# Patient Record
Sex: FEMALE | Race: WHITE | NOT HISPANIC OR LATINO | Employment: OTHER | URBAN - METROPOLITAN AREA
[De-identification: names, ages, dates, MRNs, and addresses within clinical notes are randomized per-mention and may not be internally consistent; named-entity substitution may affect disease eponyms.]

---

## 2023-05-15 DIAGNOSIS — F41.9 ANXIETY: Primary | ICD-10-CM

## 2023-05-15 DIAGNOSIS — E78.5 HYPERLIPIDEMIA, UNSPECIFIED HYPERLIPIDEMIA TYPE: ICD-10-CM

## 2023-05-15 DIAGNOSIS — R79.89 ELEVATED TSH: ICD-10-CM

## 2023-05-15 DIAGNOSIS — Z79.01 ANTICOAGULANT LONG-TERM USE: ICD-10-CM

## 2023-05-15 RX ORDER — ATORVASTATIN CALCIUM 20 MG/1
20 TABLET, FILM COATED ORAL DAILY
COMMUNITY
End: 2023-05-15 | Stop reason: SDUPTHER

## 2023-05-15 RX ORDER — ATOVAQUONE AND PROGUANIL HYDROCHLORIDE 250; 100 MG/1; MG/1
TABLET, FILM COATED ORAL
COMMUNITY
Start: 2023-05-02 | End: 2023-10-13 | Stop reason: ALTCHOICE

## 2023-05-15 RX ORDER — LEVOTHYROXINE SODIUM 25 UG/1
25 TABLET ORAL
COMMUNITY
End: 2023-05-15 | Stop reason: SDUPTHER

## 2023-05-15 RX ORDER — CITALOPRAM 20 MG/1
20 TABLET, FILM COATED ORAL DAILY
Qty: 90 TABLET | Refills: 3 | Status: SHIPPED | OUTPATIENT
Start: 2023-05-15

## 2023-05-15 RX ORDER — RIVAROXABAN 10 MG/1
10 TABLET, FILM COATED ORAL DAILY
COMMUNITY
End: 2023-05-15 | Stop reason: SDUPTHER

## 2023-05-15 RX ORDER — CITALOPRAM 20 MG/1
20 TABLET, FILM COATED ORAL DAILY
COMMUNITY
End: 2023-05-15 | Stop reason: SDUPTHER

## 2023-05-15 RX ORDER — ATORVASTATIN CALCIUM 20 MG/1
20 TABLET, FILM COATED ORAL DAILY
Qty: 90 TABLET | Refills: 3 | Status: SHIPPED | OUTPATIENT
Start: 2023-05-15 | End: 2024-06-03 | Stop reason: SDUPTHER

## 2023-05-15 RX ORDER — LEVOTHYROXINE SODIUM 25 UG/1
25 TABLET ORAL
Qty: 90 TABLET | Refills: 3 | Status: SHIPPED | OUTPATIENT
Start: 2023-05-15

## 2023-09-25 ENCOUNTER — OFFICE VISIT (OUTPATIENT)
Dept: PRIMARY CARE | Facility: CLINIC | Age: 73
End: 2023-09-25
Payer: MEDICARE

## 2023-09-25 VITALS
WEIGHT: 144 LBS | OXYGEN SATURATION: 95 % | SYSTOLIC BLOOD PRESSURE: 129 MMHG | BODY MASS INDEX: 26.5 KG/M2 | HEIGHT: 62 IN | HEART RATE: 62 BPM | DIASTOLIC BLOOD PRESSURE: 82 MMHG

## 2023-09-25 DIAGNOSIS — Z00.00 PREVENTATIVE HEALTH CARE: ICD-10-CM

## 2023-09-25 DIAGNOSIS — Z79.899 DRUG THERAPY: ICD-10-CM

## 2023-09-25 DIAGNOSIS — Z00.00 ROUTINE GENERAL MEDICAL EXAMINATION AT HEALTH CARE FACILITY: Primary | ICD-10-CM

## 2023-09-25 DIAGNOSIS — Z13.9 SCREENING FOR CONDITION: ICD-10-CM

## 2023-09-25 DIAGNOSIS — Z12.31 ENCOUNTER FOR SCREENING MAMMOGRAM FOR MALIGNANT NEOPLASM OF BREAST: ICD-10-CM

## 2023-09-25 DIAGNOSIS — E55.9 VITAMIN D DEFICIENCY: ICD-10-CM

## 2023-09-25 DIAGNOSIS — Z12.11 ENCOUNTER FOR SCREENING FOR MALIGNANT NEOPLASM OF COLON: ICD-10-CM

## 2023-09-25 DIAGNOSIS — E03.9 HYPOTHYROIDISM, UNSPECIFIED TYPE: ICD-10-CM

## 2023-09-25 DIAGNOSIS — E78.5 HYPERLIPIDEMIA, UNSPECIFIED HYPERLIPIDEMIA TYPE: ICD-10-CM

## 2023-09-25 PROCEDURE — 1159F MED LIST DOCD IN RCRD: CPT | Performed by: FAMILY MEDICINE

## 2023-09-25 PROCEDURE — 99417 PROLNG OP E/M EACH 15 MIN: CPT | Performed by: FAMILY MEDICINE

## 2023-09-25 PROCEDURE — G0439 PPPS, SUBSEQ VISIT: HCPCS | Performed by: FAMILY MEDICINE

## 2023-09-25 PROCEDURE — 1160F RVW MEDS BY RX/DR IN RCRD: CPT | Performed by: FAMILY MEDICINE

## 2023-09-25 PROCEDURE — 99397 PER PM REEVAL EST PAT 65+ YR: CPT | Performed by: FAMILY MEDICINE

## 2023-09-25 PROCEDURE — 99215 OFFICE O/P EST HI 40 MIN: CPT | Performed by: FAMILY MEDICINE

## 2023-09-25 PROCEDURE — 1170F FXNL STATUS ASSESSED: CPT | Performed by: FAMILY MEDICINE

## 2023-09-25 PROCEDURE — 1036F TOBACCO NON-USER: CPT | Performed by: FAMILY MEDICINE

## 2023-09-25 ASSESSMENT — ENCOUNTER SYMPTOMS
LOSS OF SENSATION IN FEET: 0
OCCASIONAL FEELINGS OF UNSTEADINESS: 0
DEPRESSION: 0

## 2023-09-25 ASSESSMENT — ACTIVITIES OF DAILY LIVING (ADL)
TAKING_MEDICATION: INDEPENDENT
BATHING: INDEPENDENT
DRESSING: INDEPENDENT
DOING_HOUSEWORK: INDEPENDENT
GROCERY_SHOPPING: INDEPENDENT
MANAGING_FINANCES: INDEPENDENT

## 2023-09-25 NOTE — PROGRESS NOTES
"Subjective   Reason for Visit: Jacquelyn Groves is an 73 y.o. female here for a Medicare Wellness visit.          Reviewed all medications by prescribing practitioner or clinical pharmacist (such as prescriptions, OTCs, herbal therapies and supplements) and documented in the medical record.      Patient Care Team:  Niraj Marsh DO as PCP - General (Family Medicine)  Yaneli Way MD as PCP - [employer]age PCP     Review of Systems  Denies N/V/D/C, no HA/S/V, no CP/SOB. Denies fevers/chills.  Positive positive for poison ivy.  All other systems were negative.    Objective   Vitals:  /82 (BP Location: Left arm, Patient Position: Sitting)   Pulse 62   Ht 1.562 m (5' 1.5\")   Wt 65.3 kg (144 lb)   SpO2 95%   BMI 26.77 kg/m²       Physical Exam  Gen: NAD  eyes: EOMI, PERRLA  ENT: hearing grossly intact, no nasal discharge  resp: CTABL, without R/R  heart: RRR without MRG  GI: abd: S/ND/NT, BS+  lymph: no axillary, cervical, supraclavicular lymphadenopathy noted   MS: gait grossly WNL,  derm: no rashes or lesions noted  neuro: CN II-XII grossly intact  psych: A&Ox3    Assessment/Plan   Problem List Items Addressed This Visit    None  Visit Diagnoses       Routine general medical examination at health care facility    -  Primary    Drug therapy        Relevant Medications    coQ10, ubiquinol, 200 mg capsule    Other Relevant Orders    CBC and Auto Differential    Comprehensive Metabolic Panel    Magnesium    Urinalysis with Reflex Microscopic    Encounter for screening for malignant neoplasm of colon        Relevant Orders    Cologuard® colon cancer screening    Encounter for screening mammogram for malignant neoplasm of breast        Relevant Orders    BI mammo right diagnostic    Screening for condition        Relevant Orders    Lipid Panel    Hemoglobin A1C    Hypothyroidism, unspecified type        Relevant Orders    Thyroxine, Free    Triiodothyronine, Free    Thyroid Stimulating " Hormone    Vitamin D deficiency        Relevant Orders    Vitamin D 25-Hydroxy,Total (for eval of Vitamin D levels)    Preventative health care        Relevant Orders    CBC and Auto Differential    Comprehensive Metabolic Panel    Magnesium    Lipid Panel    Hemoglobin A1C    Urinalysis with Reflex Microscopic    Vitamin D 25-Hydroxy,Total (for eval of Vitamin D levels)    Thyroxine, Free    Triiodothyronine, Free    Thyroid Stimulating Hormone    Hyperlipidemia, unspecified hyperlipidemia type        Relevant Orders    Lipid Panel                 doing Medicare wellness, annual preventative, lab planning.    -----  Due for annual flu shot, RSV immunization, next booster for COVID.  Is due for the shingles series.  Most recent Tdap was 2018.  Up-to-date on pneumonia series.  -->> Check with your pharmacy to keep up-to-date on immunizations.    Screening for colon cancer. -->> We will order Cologuard test.    Screening for breast cancer. -->> will order.  -----    Drug therapy, screening for condition, vitamin D deficiency. Is on 4000 units daily and has been for several years.  Most recent vitamin D on the chart was 31 but that was from 2019. -->>  Will check annual labs before next appointment.    Hyperlipidemia, on atorvastatin 20 mg daily, cholesterol numbers are good on that. -->>  Will refill atorvastatin as needed.  Recommend starting coenzyme Q 10/co-Q10 200 mg daily.  Cheapest is fine, no reason to buy more expensive types.  Should be able to find it for less than $0.20 a pill for the 200 mg.    Anxiety.  In general does well with 20 mg of citalopram/Celexa daily. -->>  Will refill as needed.      DVT and PE, seeing Dr. Miranda, and Griselda Jones, cardiology, is on Xarelto.  Recent cardiac CT calcium scoring result was 0. -->>  Follow-up as planned.      - Patient will return in about 6 weeks for annual lab review.  - Patient is going to go to the [facility] about a week before the appointment to get  fasting annual labs done.

## 2023-09-25 NOTE — PATIENT INSTRUCTIONS
doing Medicare wellness, annual preventative, lab planning.    -----  Due for annual flu shot, RSV immunization, next booster for COVID.  Is due for the shingles series.  Most recent Tdap was 2018.  Up-to-date on pneumonia series.  -->> Check with your pharmacy to keep up-to-date on immunizations.    Screening for colon cancer. -->> We will order Cologuard test.    Screening for breast cancer. -->> will order.  -----  Denies N/V/D/C, no HA/S/V, no CP/SOB. Denies fevers/chills.  Positive positive for poison ivy.  All other systems were negative.      Drug therapy, screening for condition, vitamin D deficiency. Is on 4000 units daily and has been for several years.  Most recent vitamin D on the chart was 31 but that was from 2019. -->>  Will check annual labs before next appointment.    Hyperlipidemia, on atorvastatin 20 mg daily, cholesterol numbers are good on that. -->>  Will refill atorvastatin as needed.  Recommend starting coenzyme Q 10/co-Q10 200 mg daily.  Cheapest is fine, no reason to buy more expensive types.  Should be able to find it for less than $0.20 a pill for the 200 mg.    Anxiety.  In general does well with 20 mg of citalopram/Celexa daily. -->>  Will refill as needed.      DVT and PE, seeing Dr. Miranda, and Griselda Jones, cardiology, is on Xarelto.  Recent cardiac CT calcium scoring result was 0. -->>  Follow-up as planned.      - Patient will return in about 6 weeks for annual lab review.  - Patient is going to go to the [facility] about a week before the appointment to get fasting annual labs done.       no

## 2023-10-10 ENCOUNTER — TELEPHONE (OUTPATIENT)
Dept: PRIMARY CARE | Facility: CLINIC | Age: 73
End: 2023-10-10
Payer: MEDICARE

## 2023-10-10 DIAGNOSIS — Z12.31 ENCOUNTER FOR SCREENING MAMMOGRAM FOR MALIGNANT NEOPLASM OF BREAST: Primary | ICD-10-CM

## 2023-10-12 PROBLEM — R31.1 BENIGN ESSENTIAL MICROSCOPIC HEMATURIA: Status: ACTIVE | Noted: 2023-10-12

## 2023-10-12 PROBLEM — F41.1 GAD (GENERALIZED ANXIETY DISORDER): Status: ACTIVE | Noted: 2018-10-23

## 2023-10-12 PROBLEM — H91.93 BILATERAL HIGH FREQUENCY HEARING LOSS: Status: ACTIVE | Noted: 2017-06-27

## 2023-10-12 PROBLEM — L57.0 ACTINIC KERATOSIS: Status: ACTIVE | Noted: 2020-10-05

## 2023-10-12 PROBLEM — L81.4 OTHER MELANIN HYPERPIGMENTATION: Status: ACTIVE | Noted: 2020-10-05

## 2023-10-12 PROBLEM — E66.3 OVERWEIGHT WITH BODY MASS INDEX (BMI) OF 26 TO 26.9 IN ADULT: Status: ACTIVE | Noted: 2023-10-12

## 2023-10-12 PROBLEM — F32.0 CURRENT MILD EPISODE OF MAJOR DEPRESSIVE DISORDER (CMS-HCC): Status: ACTIVE | Noted: 2023-10-12

## 2023-10-12 PROBLEM — Z79.01 ANTICOAGULANT LONG-TERM USE: Status: ACTIVE | Noted: 2023-10-12

## 2023-10-12 PROBLEM — L82.1 OTHER SEBORRHEIC KERATOSIS: Status: ACTIVE | Noted: 2020-10-05

## 2023-10-12 PROBLEM — D18.01 HEMANGIOMA OF SKIN AND SUBCUTANEOUS TISSUE: Status: ACTIVE | Noted: 2020-10-05

## 2023-10-12 PROBLEM — D22.5 MELANOCYTIC NEVI OF TRUNK: Status: ACTIVE | Noted: 2020-10-05

## 2023-10-12 PROBLEM — Z86.718 HISTORY OF DEEP VENOUS THROMBOSIS (DVT) OF DISTAL VEIN OF LEFT LOWER EXTREMITY: Status: ACTIVE | Noted: 2023-10-12

## 2023-10-12 PROBLEM — R79.89 ELEVATED TSH: Status: ACTIVE | Noted: 2023-10-12

## 2023-10-12 PROBLEM — E03.9 HYPOTHYROIDISM: Status: ACTIVE | Noted: 2023-10-12

## 2023-10-12 PROBLEM — M81.0 AGE RELATED OSTEOPOROSIS: Status: ACTIVE | Noted: 2023-10-12

## 2023-10-12 PROBLEM — R07.9 CHEST PAIN: Status: ACTIVE | Noted: 2023-10-12

## 2023-10-12 PROBLEM — L91.8 OTHER HYPERTROPHIC DISORDERS OF THE SKIN: Status: ACTIVE | Noted: 2020-10-05

## 2023-10-12 PROBLEM — E78.1 PURE HYPERTRIGLYCERIDEMIA: Status: ACTIVE | Noted: 2023-10-12

## 2023-10-12 PROBLEM — L30.9 DERMATITIS: Status: ACTIVE | Noted: 2023-10-12

## 2023-10-12 PROBLEM — M85.80 OSTEOPENIA: Status: ACTIVE | Noted: 2023-10-12

## 2023-10-12 PROBLEM — L23.7 POISON IVY: Status: ACTIVE | Noted: 2023-10-12

## 2023-10-12 PROBLEM — E55.9 VITAMIN D DEFICIENCY: Status: ACTIVE | Noted: 2023-10-12

## 2023-10-12 PROBLEM — D22.61 MELANOCYTIC NEVI OF RIGHT UPPER LIMB, INCLUDING SHOULDER: Status: ACTIVE | Noted: 2020-10-05

## 2023-10-12 RX ORDER — CHOLECALCIFEROL (VITAMIN D3) 25 MCG
1000 TABLET ORAL DAILY
COMMUNITY
End: 2023-10-13 | Stop reason: SDUPTHER

## 2023-10-12 RX ORDER — CHOLECALCIFEROL (VITAMIN D3) 125 MCG
4000 TABLET ORAL DAILY
COMMUNITY
Start: 2019-06-24 | End: 2023-10-13 | Stop reason: ALTCHOICE

## 2023-10-12 RX ORDER — OMEGA-3 FATTY ACIDS 1000 MG
3000 CAPSULE ORAL DAILY
COMMUNITY
Start: 2015-06-09 | End: 2023-10-13 | Stop reason: ALTCHOICE

## 2023-10-12 RX ORDER — VIT C/E/ZN/COPPR/LUTEIN/ZEAXAN 250MG-90MG
1 CAPSULE ORAL DAILY
COMMUNITY

## 2023-10-12 RX ORDER — PSYLLIUM HUSK 0.4 G
CAPSULE ORAL
COMMUNITY
End: 2023-10-13 | Stop reason: SDUPTHER

## 2023-10-12 RX ORDER — ASPIRIN 81 MG/1
81 TABLET ORAL DAILY
COMMUNITY
End: 2023-10-13 | Stop reason: ALTCHOICE

## 2023-10-12 RX ORDER — FERROUS SULFATE, DRIED 160(50) MG
1 TABLET, EXTENDED RELEASE ORAL 2 TIMES DAILY
COMMUNITY

## 2023-10-12 RX ORDER — LOPERAMIDE HYDROCHLORIDE 2 MG/1
CAPSULE ORAL
COMMUNITY
Start: 2023-05-02 | End: 2023-10-13 | Stop reason: ALTCHOICE

## 2023-10-13 ENCOUNTER — OFFICE VISIT (OUTPATIENT)
Dept: CARDIOLOGY | Facility: CLINIC | Age: 73
End: 2023-10-13
Payer: MEDICARE

## 2023-10-13 VITALS
BODY MASS INDEX: 26.31 KG/M2 | HEART RATE: 66 BPM | HEIGHT: 62 IN | OXYGEN SATURATION: 95 % | TEMPERATURE: 97.3 F | DIASTOLIC BLOOD PRESSURE: 68 MMHG | SYSTOLIC BLOOD PRESSURE: 113 MMHG | WEIGHT: 143 LBS

## 2023-10-13 DIAGNOSIS — Z86.718 HISTORY OF DEEP VENOUS THROMBOSIS (DVT) OF DISTAL VEIN OF LEFT LOWER EXTREMITY: ICD-10-CM

## 2023-10-13 DIAGNOSIS — E78.2 MIXED HYPERLIPIDEMIA: ICD-10-CM

## 2023-10-13 DIAGNOSIS — Z79.01 ANTICOAGULANT LONG-TERM USE: ICD-10-CM

## 2023-10-13 DIAGNOSIS — Z86.711 HISTORY OF PULMONARY EMBOLISM: ICD-10-CM

## 2023-10-13 PROBLEM — I26.99 OTHER PULMONARY EMBOLISM WITHOUT ACUTE COR PULMONALE (MULTI): Status: ACTIVE | Noted: 2023-10-13

## 2023-10-13 PROCEDURE — 1036F TOBACCO NON-USER: CPT | Performed by: INTERNAL MEDICINE

## 2023-10-13 PROCEDURE — 99214 OFFICE O/P EST MOD 30 MIN: CPT | Performed by: INTERNAL MEDICINE

## 2023-10-13 PROCEDURE — 1159F MED LIST DOCD IN RCRD: CPT | Performed by: INTERNAL MEDICINE

## 2023-10-13 PROCEDURE — 1160F RVW MEDS BY RX/DR IN RCRD: CPT | Performed by: INTERNAL MEDICINE
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
## 2023-10-13 NOTE — PATIENT INSTRUCTIONS
Good to see you today Ms. Groves.  Continue medications as prescribed.  Please reach out to us when you have your blood work done so we can view results.    See you back in 1 year for follow up.     Very truly yours,    Griselda Knowles MD  Co-Director, Vascular Center  [facility], [facility]   [facility] Master Clinician in Fibromuscular Dysplasia and Vascular Care  Professor of Medicine  [facility]

## 2023-10-17 LAB — NONINV COLON CA DNA+OCC BLD SCRN STL QL: NEGATIVE

## 2023-10-23 ENCOUNTER — LAB (OUTPATIENT)
Dept: LAB | Facility: LAB | Age: 73
End: 2023-10-23
Payer: MEDICARE

## 2023-10-23 DIAGNOSIS — Z79.899 DRUG THERAPY: ICD-10-CM

## 2023-10-23 DIAGNOSIS — Z13.9 SCREENING FOR CONDITION: ICD-10-CM

## 2023-10-23 DIAGNOSIS — E55.9 VITAMIN D DEFICIENCY: ICD-10-CM

## 2023-10-23 DIAGNOSIS — E78.5 HYPERLIPIDEMIA, UNSPECIFIED HYPERLIPIDEMIA TYPE: ICD-10-CM

## 2023-10-23 DIAGNOSIS — E03.9 HYPOTHYROIDISM, UNSPECIFIED TYPE: ICD-10-CM

## 2023-10-23 DIAGNOSIS — Z00.00 PREVENTATIVE HEALTH CARE: ICD-10-CM

## 2023-10-23 LAB
25(OH)D3 SERPL-MCNC: 65 NG/ML (ref 30–100)
ALBUMIN SERPL BCP-MCNC: 3.8 G/DL (ref 3.4–5)
ALP SERPL-CCNC: 76 U/L (ref 33–136)
ALT SERPL W P-5'-P-CCNC: 9 U/L (ref 7–45)
ANION GAP SERPL CALC-SCNC: 9 MMOL/L (ref 10–20)
APPEARANCE UR: ABNORMAL
AST SERPL W P-5'-P-CCNC: 13 U/L (ref 9–39)
BACTERIA #/AREA URNS AUTO: ABNORMAL /HPF
BASOPHILS # BLD AUTO: 0.03 X10*3/UL (ref 0–0.1)
BASOPHILS NFR BLD AUTO: 0.4 %
BILIRUB SERPL-MCNC: 0.6 MG/DL (ref 0–1.2)
BILIRUB UR STRIP.AUTO-MCNC: NEGATIVE MG/DL
BUN SERPL-MCNC: 21 MG/DL (ref 6–23)
CALCIUM SERPL-MCNC: 9 MG/DL (ref 8.6–10.3)
CHLORIDE SERPL-SCNC: 106 MMOL/L (ref 98–107)
CHOLEST SERPL-MCNC: 133 MG/DL (ref 0–199)
CHOLESTEROL/HDL RATIO: 3.1
CO2 SERPL-SCNC: 30 MMOL/L (ref 21–32)
COLOR UR: YELLOW
CREAT SERPL-MCNC: 1.11 MG/DL (ref 0.5–1.05)
EOSINOPHIL # BLD AUTO: 0.12 X10*3/UL (ref 0–0.4)
EOSINOPHIL NFR BLD AUTO: 1.8 %
ERYTHROCYTE [DISTWIDTH] IN BLOOD BY AUTOMATED COUNT: 12.4 % (ref 11.5–14.5)
EST. AVERAGE GLUCOSE BLD GHB EST-MCNC: 105 MG/DL
GFR SERPL CREATININE-BSD FRML MDRD: 52 ML/MIN/1.73M*2
GLUCOSE SERPL-MCNC: 95 MG/DL (ref 74–99)
GLUCOSE UR STRIP.AUTO-MCNC: NEGATIVE MG/DL
HBA1C MFR BLD: 5.3 %
HCT VFR BLD AUTO: 40.6 % (ref 36–46)
HDLC SERPL-MCNC: 42.6 MG/DL
HGB BLD-MCNC: 13.7 G/DL (ref 12–16)
IMM GRANULOCYTES # BLD AUTO: 0.01 X10*3/UL (ref 0–0.5)
IMM GRANULOCYTES NFR BLD AUTO: 0.1 % (ref 0–0.9)
KETONES UR STRIP.AUTO-MCNC: NEGATIVE MG/DL
LDLC SERPL CALC-MCNC: 64 MG/DL
LEUKOCYTE ESTERASE UR QL STRIP.AUTO: ABNORMAL
LYMPHOCYTES # BLD AUTO: 1.84 X10*3/UL (ref 0.8–3)
LYMPHOCYTES NFR BLD AUTO: 27.1 %
MAGNESIUM SERPL-MCNC: 2 MG/DL (ref 1.6–2.4)
MCH RBC QN AUTO: 32.1 PG (ref 26–34)
MCHC RBC AUTO-ENTMCNC: 33.7 G/DL (ref 32–36)
MCV RBC AUTO: 95 FL (ref 80–100)
MONOCYTES # BLD AUTO: 0.45 X10*3/UL (ref 0.05–0.8)
MONOCYTES NFR BLD AUTO: 6.6 %
MUCOUS THREADS #/AREA URNS AUTO: ABNORMAL /LPF
NEUTROPHILS # BLD AUTO: 4.35 X10*3/UL (ref 1.6–5.5)
NEUTROPHILS NFR BLD AUTO: 64 %
NITRITE UR QL STRIP.AUTO: POSITIVE
NON HDL CHOLESTEROL: 90 MG/DL (ref 0–149)
NRBC BLD-RTO: 0 /100 WBCS (ref 0–0)
PH UR STRIP.AUTO: 6 [PH]
PLATELET # BLD AUTO: 213 X10*3/UL (ref 150–450)
PMV BLD AUTO: 9.6 FL (ref 7.5–11.5)
POTASSIUM SERPL-SCNC: 3.8 MMOL/L (ref 3.5–5.3)
PROT SERPL-MCNC: 6.6 G/DL (ref 6.4–8.2)
PROT UR STRIP.AUTO-MCNC: ABNORMAL MG/DL
RBC # BLD AUTO: 4.27 X10*6/UL (ref 4–5.2)
RBC # UR STRIP.AUTO: ABNORMAL /UL
RBC #/AREA URNS AUTO: ABNORMAL /HPF
SODIUM SERPL-SCNC: 141 MMOL/L (ref 136–145)
SP GR UR STRIP.AUTO: 1.02
SQUAMOUS #/AREA URNS AUTO: ABNORMAL /HPF
T3FREE SERPL-MCNC: 3.5 PG/ML (ref 2.3–4.2)
T4 FREE SERPL-MCNC: 0.88 NG/DL (ref 0.61–1.12)
TRIGL SERPL-MCNC: 133 MG/DL (ref 0–149)
TSH SERPL-ACNC: 2.86 MIU/L (ref 0.44–3.98)
UROBILINOGEN UR STRIP.AUTO-MCNC: <2 MG/DL
VLDL: 27 MG/DL (ref 0–40)
WBC # BLD AUTO: 6.8 X10*3/UL (ref 4.4–11.3)
WBC #/AREA URNS AUTO: ABNORMAL /HPF

## 2023-10-23 PROCEDURE — 83036 HEMOGLOBIN GLYCOSYLATED A1C: CPT

## 2023-10-23 PROCEDURE — 80053 COMPREHEN METABOLIC PANEL: CPT

## 2023-10-23 PROCEDURE — 84443 ASSAY THYROID STIM HORMONE: CPT

## 2023-10-23 PROCEDURE — 84481 FREE ASSAY (FT-3): CPT

## 2023-10-23 PROCEDURE — 80061 LIPID PANEL: CPT

## 2023-10-23 PROCEDURE — 81001 URINALYSIS AUTO W/SCOPE: CPT

## 2023-10-23 PROCEDURE — 82306 VITAMIN D 25 HYDROXY: CPT

## 2023-10-23 PROCEDURE — 36415 COLL VENOUS BLD VENIPUNCTURE: CPT

## 2023-10-23 PROCEDURE — 83735 ASSAY OF MAGNESIUM: CPT

## 2023-10-23 PROCEDURE — 85025 COMPLETE CBC W/AUTO DIFF WBC: CPT

## 2023-10-23 PROCEDURE — 84439 ASSAY OF FREE THYROXINE: CPT

## 2023-10-30 ENCOUNTER — HOSPITAL ENCOUNTER (OUTPATIENT)
Dept: RADIOLOGY | Facility: HOSPITAL | Age: 73
Discharge: HOME | End: 2023-10-30
Payer: MEDICARE

## 2023-10-30 DIAGNOSIS — Z12.31 ENCOUNTER FOR SCREENING MAMMOGRAM FOR MALIGNANT NEOPLASM OF BREAST: ICD-10-CM

## 2023-10-30 PROCEDURE — 77063 BREAST TOMOSYNTHESIS BI: CPT | Performed by: RADIOLOGY

## 2023-10-30 PROCEDURE — 77067 SCR MAMMO BI INCL CAD: CPT | Performed by: RADIOLOGY

## 2023-10-30 PROCEDURE — 77067 SCR MAMMO BI INCL CAD: CPT

## 2023-11-06 ENCOUNTER — APPOINTMENT (OUTPATIENT)
Dept: PRIMARY CARE | Facility: CLINIC | Age: 73
End: 2023-11-06
Payer: MEDICARE

## 2023-11-13 ENCOUNTER — OFFICE VISIT (OUTPATIENT)
Dept: PRIMARY CARE | Facility: CLINIC | Age: 73
End: 2023-11-13
Payer: MEDICARE

## 2023-11-13 VITALS
SYSTOLIC BLOOD PRESSURE: 113 MMHG | HEART RATE: 59 BPM | WEIGHT: 142 LBS | DIASTOLIC BLOOD PRESSURE: 74 MMHG | BODY MASS INDEX: 26.13 KG/M2 | HEIGHT: 62 IN | OXYGEN SATURATION: 97 %

## 2023-11-13 DIAGNOSIS — E78.5 HYPERLIPIDEMIA, UNSPECIFIED HYPERLIPIDEMIA TYPE: ICD-10-CM

## 2023-11-13 DIAGNOSIS — Z79.899 DRUG THERAPY: ICD-10-CM

## 2023-11-13 DIAGNOSIS — E03.9 HYPOTHYROIDISM, UNSPECIFIED TYPE: Primary | ICD-10-CM

## 2023-11-13 DIAGNOSIS — E55.9 VITAMIN D DEFICIENCY: ICD-10-CM

## 2023-11-13 DIAGNOSIS — Z13.9 SCREENING FOR CONDITION: ICD-10-CM

## 2023-11-13 DIAGNOSIS — Z00.00 PREVENTATIVE HEALTH CARE: ICD-10-CM

## 2023-11-13 PROCEDURE — 99214 OFFICE O/P EST MOD 30 MIN: CPT | Performed by: FAMILY MEDICINE

## 2023-11-13 PROCEDURE — 1160F RVW MEDS BY RX/DR IN RCRD: CPT | Performed by: FAMILY MEDICINE

## 2023-11-13 PROCEDURE — 1159F MED LIST DOCD IN RCRD: CPT | Performed by: FAMILY MEDICINE

## 2023-11-13 PROCEDURE — 1036F TOBACCO NON-USER: CPT | Performed by: FAMILY MEDICINE

## 2023-11-13 NOTE — PROGRESS NOTES
"pSubjective   Reason for Visit: Jacquelyn Groves is an 73 y.o. female here for a Medicare Wellness visit.                 Patient Care Team:  Nirja Marsh DO as PCP - General (Family Medicine)  Yaneli Way MD as PCP - [employer]vantage PCP     Review of Systems  Denies N/V/D/C, no HA/S/V, no CP/SOB. Denies fevers/chills.  Positive positive for poison ivy.  All other systems were negative.    Objective   Vitals:  /74 (BP Location: Left arm, Patient Position: Sitting)   Pulse 59   Ht 1.575 m (5' 2\")   Wt 64.4 kg (142 lb)   SpO2 97%   BMI 25.97 kg/m²       Physical Exam  Gen: NAD  eyes: EOMI, PERRLA  ENT: hearing grossly intact, no nasal discharge  resp: CTABL, without R/R  heart: RRR without MRG  GI: abd: S/ND/NT, BS+  lymph: no axillary, cervical, supraclavicular lymphadenopathy noted   MS: gait grossly WNL,  derm: no rashes or lesions noted  neuro: CN II-XII grossly intact  psych: A&Ox3    Assessment/Plan   Problem List Items Addressed This Visit       Hyperlipidemia    Hypothyroidism - Primary    Relevant Orders    Thyroxine, Free    Triiodothyronine, Free    Thyroid Stimulating Hormone    Vitamin D deficiency    Relevant Orders    Vitamin D 25-Hydroxy,Total (for eval of Vitamin D levels)    Screening for condition    Relevant Orders    Lipid Panel    Hemoglobin A1C    Drug therapy    Relevant Orders    CBC and Auto Differential    Comprehensive Metabolic Panel    Magnesium    Urinalysis with Reflex Microscopic    Preventative health care    Relevant Orders    CBC and Auto Differential    Comprehensive Metabolic Panel    Magnesium    Lipid Panel    Hemoglobin A1C    Urinalysis with Reflex Microscopic    Vitamin D 25-Hydroxy,Total (for eval of Vitamin D levels)    Thyroxine, Free    Triiodothyronine, Free    Thyroid Stimulating Hormone       2024 will be next Medicare wellness, annual preventative    -----  Up-to-date with annual flu shot, RSV, and latest booster for COVID.  Is due " for the shingles series.  Most recent Tdap was 2018.  Up-to-date on pneumonia series.  -->> Check with your pharmacy to keep up-to-date on immunizations.    Screening for colon cancer. -->>  Next Cologuard due around October 2026.    Screening for breast cancer. -->>  We will plan to repeat next mammogram around October 2025.     -----      New labs reviewed:  -White blood cells in urine, 6-10 WBC per hpf noted on microscopic exam.  Patient denies any symptoms of UTI.  In the past has had positive symptoms when she had a UTI. -->>  We will recheck urinalysis in 3 to 12 months.  - Drug therapy, screening for condition, A1c is 5.3, so no diabetes.  -Hypothyroidism, TSH is 2.86. free T4 about 60% of normal range, free T3 also about 60% of normal range.  Patient denies any symptoms of hyper or hypothyroidism. -->>  Continue 25 mcg daily.  We will recheck in a year.  - vitamin D deficiency. 65 on this lab, 1 in 2019.  Goal is . Is on 4000 units daily which would round out to 28,000/week and has been for several years. -->>  Increase to 34 or 35,000 units weekly.  Could continue 4000 daily but Monday Wednesday Friday take 6000.  When you buy pills just buy 5000's and take 1 daily.  Will check  with next annual labs.  -Stage IIIa renal insufficiency/chronic kidney disease.  Estimated GFR 52, same as it was 2019.  Very stable over the last 4 years. -->>  We will recheck with next annual labs.  - Hyperlipidemia, HDL is 43, goal is 45 or more.  LDL is 64, goal is less than 70, total Bidgood ratio is 3.1, goal is less than 2.8.  On atorvastatin 20 mg daily, cholesterol numbers are good on that. -->>  Will refill atorvastatin as needed.  Recommend starting coenzyme Q 10/co-Q10 200 mg daily.  Cheapest is fine, no reason to buy more expensive types.  Should be able to find it for less than $0.20 a pill for the 200 mg.    Anxiety.  In general does well with 20 mg of citalopram/Celexa daily. -->>  Will refill as  needed.      DVT and PE, seeing Dr. Miranda, and Griselda Jones, cardiology, is on Xarelto.  Recent cardiac CT calcium scoring result was 0. -->>  Follow-up as planned.      - Patient will return in October 2024 for Medicare wellness with annual preventative and annual lab review.    - Patient is going to go to the [facility] about a week before the appointment to get fasting annual labs done.

## 2023-11-13 NOTE — PATIENT INSTRUCTIONS
2024 will be next Medicare wellness, annual preventative    -----  Up-to-date with annual flu shot, RSV, and latest booster for COVID.  Is due for the shingles series.  Most recent Tdap was 2018.  Up-to-date on pneumonia series.  -->> Check with your pharmacy to keep up-to-date on immunizations.    Screening for colon cancer. -->>  Next Cologuard due around October 2026.    Screening for breast cancer. -->>  We will plan to repeat next mammogram around October 2025.     -----      New labs reviewed:  -White blood cells in urine, 6-10 WBC per hpf noted on microscopic exam.  Patient denies any symptoms of UTI.  In the past has had positive symptoms when she had a UTI. -->>  We will recheck urinalysis in 3 to 12 months.  - Drug therapy, screening for condition, A1c is 5.3, so no diabetes.  -Hypothyroidism, TSH is 2.86. free T4 about 60% of normal range, free T3 also about 60% of normal range.  Patient denies any symptoms of hyper or hypothyroidism. -->>  Continue 25 mcg daily.  We will recheck in a year.  - vitamin D deficiency. 65 on this lab, 1 in 2019.  Goal is . Is on 4000 units daily which would round out to 28,000/week and has been for several years. -->>  Increase to 34 or 35,000 units weekly.  Could continue 4000 daily but Monday Wednesday Friday take 6000.  When you buy pills just buy 5000's and take 1 daily.  Will check  with next annual labs.  -Stage IIIa renal insufficiency/chronic kidney disease.  Estimated GFR 52, same as it was 2019.  Very stable over the last 4 years. -->>  We will recheck with next annual labs.  - Hyperlipidemia, HDL is 43, goal is 45 or more.  LDL is 64, goal is less than 70, total Bidgood ratio is 3.1, goal is less than 2.8.  On atorvastatin 20 mg daily, cholesterol numbers are good on that. -->>  Will refill atorvastatin as needed.  Recommend starting coenzyme Q 10/co-Q10 200 mg daily.  Cheapest is fine, no reason to buy more expensive types.  Should be able to find it for  less than $0.20 a pill for the 200 mg.    Anxiety.  In general does well with 20 mg of citalopram/Celexa daily. -->>  Will refill as needed.      DVT and PE, seeing Dr. Miranda, and Griselda Jones, cardiology, is on Xarelto.  Recent cardiac CT calcium scoring result was 0. -->>  Follow-up as planned.      - Patient will return in October 2024 for Medicare wellness with annual preventative and annual lab review.    - Patient is going to go to the [facility] about a week before the appointment to get fasting annual labs done.

## 2024-06-03 DIAGNOSIS — E78.5 HYPERLIPIDEMIA, UNSPECIFIED HYPERLIPIDEMIA TYPE: ICD-10-CM

## 2024-06-04 RX ORDER — ATORVASTATIN CALCIUM 20 MG/1
20 TABLET, FILM COATED ORAL DAILY
Qty: 90 TABLET | Refills: 3 | Status: SHIPPED | OUTPATIENT
Start: 2024-06-04

## 2024-06-19 ENCOUNTER — TELEPHONE (OUTPATIENT)
Dept: PRIMARY CARE | Facility: CLINIC | Age: 74
End: 2024-06-19
Payer: MEDICARE

## 2024-06-19 DIAGNOSIS — L23.7 POISON IVY: Primary | ICD-10-CM

## 2024-06-19 RX ORDER — PREDNISONE 5 MG/1
TABLET ORAL
Qty: 21 TABLET | Refills: 0 | Status: SHIPPED | OUTPATIENT
Start: 2024-06-19

## 2024-06-19 NOTE — TELEPHONE ENCOUNTER
Pt calling in today with complaints of poison Ivy, says usually happens every year. Dr. LY used to prescribe prednisone, she is asking if this is possible to do again for her?  Please advise.

## 2024-07-01 ENCOUNTER — CLINICAL SUPPORT (OUTPATIENT)
Dept: PRIMARY CARE | Facility: CLINIC | Age: 74
End: 2024-07-01
Payer: MEDICARE

## 2024-07-01 DIAGNOSIS — L30.9 DERMATITIS: ICD-10-CM

## 2024-07-01 DIAGNOSIS — L23.7 POISON IVY: ICD-10-CM

## 2024-07-01 PROCEDURE — 96372 THER/PROPH/DIAG INJ SC/IM: CPT | Performed by: FAMILY MEDICINE

## 2024-07-01 RX ORDER — METHYLPREDNISOLONE ACETATE 40 MG/ML
40 INJECTION, SUSPENSION INTRA-ARTICULAR; INTRALESIONAL; INTRAMUSCULAR; SOFT TISSUE ONCE
Status: COMPLETED | OUTPATIENT
Start: 2024-07-01 | End: 2024-07-01

## 2024-07-01 NOTE — PROGRESS NOTES
Subjective   Patient ID: Jacquelyn Groves is a 73 y.o. female who presents for Depo Injection (Pt in today for a depo injection).    HPI     Review of Systems    Objective   There were no vitals taken for this visit.    Physical Exam    Assessment/Plan

## 2024-07-16 ENCOUNTER — APPOINTMENT (OUTPATIENT)
Dept: CARDIOLOGY | Facility: CLINIC | Age: 74
End: 2024-07-16
Payer: MEDICARE

## 2024-07-16 VITALS
BODY MASS INDEX: 26.62 KG/M2 | WEIGHT: 141 LBS | HEART RATE: 64 BPM | HEIGHT: 61 IN | DIASTOLIC BLOOD PRESSURE: 60 MMHG | SYSTOLIC BLOOD PRESSURE: 120 MMHG

## 2024-07-16 DIAGNOSIS — Z79.01 ANTICOAGULANT LONG-TERM USE: ICD-10-CM

## 2024-07-16 DIAGNOSIS — I27.82 OTHER CHRONIC PULMONARY EMBOLISM WITHOUT ACUTE COR PULMONALE (MULTI): ICD-10-CM

## 2024-07-16 DIAGNOSIS — E66.3 OVERWEIGHT WITH BODY MASS INDEX (BMI) OF 26 TO 26.9 IN ADULT: ICD-10-CM

## 2024-07-16 DIAGNOSIS — Z78.9 NEVER SMOKED CIGARETTES: ICD-10-CM

## 2024-07-16 DIAGNOSIS — E78.2 MIXED HYPERLIPIDEMIA: ICD-10-CM

## 2024-07-16 PROCEDURE — 1159F MED LIST DOCD IN RCRD: CPT | Performed by: INTERNAL MEDICINE

## 2024-07-16 PROCEDURE — 99214 OFFICE O/P EST MOD 30 MIN: CPT | Performed by: INTERNAL MEDICINE

## 2024-07-16 PROCEDURE — 3008F BODY MASS INDEX DOCD: CPT | Performed by: INTERNAL MEDICINE

## 2024-07-16 PROCEDURE — 1036F TOBACCO NON-USER: CPT | Performed by: INTERNAL MEDICINE

## 2024-07-16 NOTE — PROGRESS NOTES
Referred by Dr. Bui ref. provider found provider found for   Chief Complaint   Patient presents with    Follow-up     1 year follow-up        History of Present Illness  Jacquelyn Groves is a 73 y.o. year old female patient history of pulmonary emboli on Xarelto.  Doing well from a cardiac standpoint no complaint no symptoms of chest pain or shortness of breath.  She has been walking no difficulty.  No sign of bleeding.  I discussed with the patient Kneifl continue medication she has been followed by vascular medicine.  She will see me back only as needed    Past Medical History  Past Medical History:   Diagnosis Date    Acute embolism and thrombosis of unspecified deep veins of left lower extremity (Multi) 09/15/2022    Left leg DVT    COVID-19 09/15/2022    COVID    Other pulmonary embolism without acute cor pulmonale (Multi) 09/15/2022    Other pulmonary embolism without acute cor pulmonale, unspecified chronicity       Social History  Social History     Tobacco Use    Smoking status: Never    Smokeless tobacco: Never   Substance Use Topics    Alcohol use: Never    Drug use: Never       Family History     Family History   Problem Relation Name Age of Onset    Hypertension Mother      Heart attack Mother      Hyperlipidemia Sibling         Review of Systems  As per HPI, all other systems reviewed and negative.    Allergies:  Allergies   Allergen Reactions    Poison Carlee Other    Iodinated Contrast Media Other     nausea and vomiting See ED note 6/13/2019  Hypotension           Outpatient Medications:  Current Outpatient Medications   Medication Instructions    atorvastatin (LIPITOR) 20 mg, oral, Daily    calcium carbonate-vitamin D3 500 mg-5 mcg (200 unit) tablet 1 tablet, oral, 2 times daily    citalopram (CELEXA) 20 mg, oral, Daily    levothyroxine (SYNTHROID, LEVOXYL) 25 mcg, oral, Daily before breakfast, Take on an empty stomach    omega-3 fatty acids-fish oil 360-1,200 mg capsule 1 capsule, oral, Daily     predniSONE (Deltasone) 5 mg tablet Day 1: 6 by mouth, day 2: 5 by mouth, decreasing by 1 pill daily till day 6: 1 by mouth.    rivaroxaban (XARELTO) 10 mg, oral, Daily         Vitals:  Vitals:    07/16/24 1402   BP: 120/60   Pulse: 64       Physical Exam:  Physical Exam  Vitals and nursing note reviewed.   Constitutional:       Appearance: Normal appearance.   HENT:      Head: Normocephalic.   Eyes:      Pupils: Pupils are equal, round, and reactive to light.   Cardiovascular:      Rate and Rhythm: Normal rate and regular rhythm.      Pulses: Normal pulses.      Heart sounds: Normal heart sounds.   Pulmonary:      Effort: Pulmonary effort is normal.      Breath sounds: Normal breath sounds.   Musculoskeletal:         General: Normal range of motion.      Cervical back: Normal range of motion.   Skin:     General: Skin is dry.   Neurological:      General: No focal deficit present.      Mental Status: She is alert and oriented to person, place, and time.   Psychiatric:         Behavior: Behavior is cooperative.             Assessment/Plan   Diagnoses and all orders for this visit:  Anticoagulant long-term use  Mixed hyperlipidemia  Overweight with body mass index (BMI) of 26 to 26.9 in adult  Never smoked cigarettes  Other chronic pulmonary embolism without acute cor pulmonale (Multi)          Sravanthi Miranda MD [facility]  Interventional Cardiology   of [facility]     Thank you for allowing me to participate in the care of this patient. Please do not hesitate to contact me with any further questions or concerns.

## 2024-07-16 NOTE — PATIENT INSTRUCTIONS
Follow up as needed    Continue same medications/treatment.  Patient educated on proper medication use.  Patient educated on risk factor modification.  Please bring any lab results from other providers / physicians to your next appointment.    Please bring all medicines, vitamins and herbal supplements with you when you come to the office.    Prescriptions will not be filled unless you are compliant with your follow up appointments or have a follow up  appointment scheduled as per instruction of your physician.  Refills should be requested at the time of  Your visit.    IDanika LPN, am scribing for and in the presence of  Dr. Sravanthi Miranda MD, FACC

## 2024-07-23 DIAGNOSIS — F41.9 ANXIETY: ICD-10-CM

## 2024-07-24 RX ORDER — CITALOPRAM 20 MG/1
20 TABLET, FILM COATED ORAL DAILY
Qty: 90 TABLET | Refills: 3 | Status: SHIPPED | OUTPATIENT
Start: 2024-07-24

## 2024-08-15 DIAGNOSIS — Z79.01 ANTICOAGULANT LONG-TERM USE: ICD-10-CM

## 2024-08-26 DIAGNOSIS — R79.89 ELEVATED TSH: ICD-10-CM

## 2024-08-27 RX ORDER — LEVOTHYROXINE SODIUM 25 UG/1
25 TABLET ORAL
Qty: 90 TABLET | Refills: 3 | Status: SHIPPED | OUTPATIENT
Start: 2024-08-27

## 2024-08-29 ENCOUNTER — TELEPHONE (OUTPATIENT)
Dept: PRIMARY CARE | Facility: CLINIC | Age: 74
End: 2024-08-29
Payer: MEDICARE

## 2024-08-30 DIAGNOSIS — L23.7 POISON IVY: ICD-10-CM

## 2024-08-30 RX ORDER — PREDNISONE 5 MG/1
TABLET ORAL
Qty: 21 TABLET | Refills: 0 | Status: SHIPPED | OUTPATIENT
Start: 2024-08-30

## 2024-09-23 ENCOUNTER — LAB (OUTPATIENT)
Dept: LAB | Facility: LAB | Age: 74
End: 2024-09-23
Payer: MEDICARE

## 2024-09-23 DIAGNOSIS — Z13.9 SCREENING FOR CONDITION: ICD-10-CM

## 2024-09-23 DIAGNOSIS — Z79.899 DRUG THERAPY: ICD-10-CM

## 2024-09-23 DIAGNOSIS — E55.9 VITAMIN D DEFICIENCY: ICD-10-CM

## 2024-09-23 DIAGNOSIS — E03.9 HYPOTHYROIDISM, UNSPECIFIED TYPE: ICD-10-CM

## 2024-09-23 DIAGNOSIS — Z00.00 PREVENTATIVE HEALTH CARE: ICD-10-CM

## 2024-09-23 LAB
25(OH)D3 SERPL-MCNC: 56 NG/ML (ref 30–100)
ALBUMIN SERPL BCP-MCNC: 4.1 G/DL (ref 3.4–5)
ALP SERPL-CCNC: 82 U/L (ref 33–136)
ALT SERPL W P-5'-P-CCNC: 13 U/L (ref 7–45)
ANION GAP SERPL CALC-SCNC: 9 MMOL/L (ref 10–20)
APPEARANCE UR: ABNORMAL
AST SERPL W P-5'-P-CCNC: 18 U/L (ref 9–39)
BACTERIA #/AREA URNS AUTO: ABNORMAL /HPF
BASOPHILS # BLD AUTO: 0.01 X10*3/UL (ref 0–0.1)
BASOPHILS NFR BLD AUTO: 0.2 %
BILIRUB SERPL-MCNC: 0.7 MG/DL (ref 0–1.2)
BILIRUB UR STRIP.AUTO-MCNC: NEGATIVE MG/DL
BUN SERPL-MCNC: 20 MG/DL (ref 6–23)
CALCIUM SERPL-MCNC: 9.2 MG/DL (ref 8.6–10.3)
CHLORIDE SERPL-SCNC: 103 MMOL/L (ref 98–107)
CHOLEST SERPL-MCNC: 178 MG/DL (ref 0–199)
CHOLESTEROL/HDL RATIO: 3.3
CO2 SERPL-SCNC: 32 MMOL/L (ref 21–32)
COLOR UR: ABNORMAL
CREAT SERPL-MCNC: 1.07 MG/DL (ref 0.5–1.05)
EGFRCR SERPLBLD CKD-EPI 2021: 54 ML/MIN/1.73M*2
EOSINOPHIL # BLD AUTO: 0.15 X10*3/UL (ref 0–0.4)
EOSINOPHIL NFR BLD AUTO: 2.7 %
ERYTHROCYTE [DISTWIDTH] IN BLOOD BY AUTOMATED COUNT: 12.4 % (ref 11.5–14.5)
GLUCOSE SERPL-MCNC: 96 MG/DL (ref 74–99)
GLUCOSE UR STRIP.AUTO-MCNC: NORMAL MG/DL
HCT VFR BLD AUTO: 43.6 % (ref 36–46)
HDLC SERPL-MCNC: 53.4 MG/DL
HGB BLD-MCNC: 14.6 G/DL (ref 12–16)
IMM GRANULOCYTES # BLD AUTO: 0.02 X10*3/UL (ref 0–0.5)
IMM GRANULOCYTES NFR BLD AUTO: 0.4 % (ref 0–0.9)
KETONES UR STRIP.AUTO-MCNC: NEGATIVE MG/DL
LDLC SERPL CALC-MCNC: 88 MG/DL
LEUKOCYTE ESTERASE UR QL STRIP.AUTO: ABNORMAL
LYMPHOCYTES # BLD AUTO: 1.78 X10*3/UL (ref 0.8–3)
LYMPHOCYTES NFR BLD AUTO: 31.7 %
MAGNESIUM SERPL-MCNC: 2.15 MG/DL (ref 1.6–2.4)
MCH RBC QN AUTO: 32.6 PG (ref 26–34)
MCHC RBC AUTO-ENTMCNC: 33.5 G/DL (ref 32–36)
MCV RBC AUTO: 97 FL (ref 80–100)
MONOCYTES # BLD AUTO: 0.46 X10*3/UL (ref 0.05–0.8)
MONOCYTES NFR BLD AUTO: 8.2 %
NEUTROPHILS # BLD AUTO: 3.2 X10*3/UL (ref 1.6–5.5)
NEUTROPHILS NFR BLD AUTO: 56.8 %
NITRITE UR QL STRIP.AUTO: NEGATIVE
NON HDL CHOLESTEROL: 125 MG/DL (ref 0–149)
NRBC BLD-RTO: 0 /100 WBCS (ref 0–0)
PH UR STRIP.AUTO: 7 [PH]
PLATELET # BLD AUTO: 215 X10*3/UL (ref 150–450)
POTASSIUM SERPL-SCNC: 4.1 MMOL/L (ref 3.5–5.3)
PROT SERPL-MCNC: 6.8 G/DL (ref 6.4–8.2)
PROT UR STRIP.AUTO-MCNC: NEGATIVE MG/DL
RBC # BLD AUTO: 4.48 X10*6/UL (ref 4–5.2)
RBC # UR STRIP.AUTO: ABNORMAL /UL
RBC #/AREA URNS AUTO: ABNORMAL /HPF
SODIUM SERPL-SCNC: 140 MMOL/L (ref 136–145)
SP GR UR STRIP.AUTO: 1.01
SQUAMOUS #/AREA URNS AUTO: ABNORMAL /HPF
T4 FREE SERPL-MCNC: 0.91 NG/DL (ref 0.61–1.12)
TRANS CELLS #/AREA UR COMP ASSIST: ABNORMAL /HPF
TRIGL SERPL-MCNC: 184 MG/DL (ref 0–149)
TSH SERPL-ACNC: 4.23 MIU/L (ref 0.44–3.98)
UROBILINOGEN UR STRIP.AUTO-MCNC: NORMAL MG/DL
VLDL: 37 MG/DL (ref 0–40)
WBC # BLD AUTO: 5.6 X10*3/UL (ref 4.4–11.3)
WBC #/AREA URNS AUTO: ABNORMAL /HPF

## 2024-09-23 PROCEDURE — 36415 COLL VENOUS BLD VENIPUNCTURE: CPT

## 2024-09-24 LAB
EST. AVERAGE GLUCOSE BLD GHB EST-MCNC: 108 MG/DL
HBA1C MFR BLD: 5.4 %
T3FREE SERPL-MCNC: 3.3 PG/ML (ref 2.3–4.2)

## 2024-09-27 ENCOUNTER — APPOINTMENT (OUTPATIENT)
Dept: PRIMARY CARE | Facility: CLINIC | Age: 74
End: 2024-09-27
Payer: MEDICARE

## 2024-09-27 VITALS
WEIGHT: 141 LBS | DIASTOLIC BLOOD PRESSURE: 87 MMHG | BODY MASS INDEX: 26.62 KG/M2 | OXYGEN SATURATION: 95 % | HEIGHT: 61 IN | HEART RATE: 56 BPM | SYSTOLIC BLOOD PRESSURE: 150 MMHG

## 2024-09-27 DIAGNOSIS — Z12.11 ENCOUNTER FOR SCREENING FOR MALIGNANT NEOPLASM OF COLON: ICD-10-CM

## 2024-09-27 DIAGNOSIS — Z00.00 PREVENTATIVE HEALTH CARE: ICD-10-CM

## 2024-09-27 DIAGNOSIS — Z79.899 DRUG THERAPY: ICD-10-CM

## 2024-09-27 DIAGNOSIS — E55.9 VITAMIN D DEFICIENCY: ICD-10-CM

## 2024-09-27 DIAGNOSIS — Z13.9 SCREENING FOR CONDITION: ICD-10-CM

## 2024-09-27 DIAGNOSIS — E78.5 HYPERLIPIDEMIA, UNSPECIFIED HYPERLIPIDEMIA TYPE: ICD-10-CM

## 2024-09-27 DIAGNOSIS — Z12.31 ENCOUNTER FOR SCREENING MAMMOGRAM FOR MALIGNANT NEOPLASM OF BREAST: ICD-10-CM

## 2024-09-27 DIAGNOSIS — R31.9 HEMATURIA, UNSPECIFIED TYPE: ICD-10-CM

## 2024-09-27 DIAGNOSIS — E03.9 HYPOTHYROIDISM, UNSPECIFIED TYPE: ICD-10-CM

## 2024-09-27 DIAGNOSIS — Z00.00 ROUTINE GENERAL MEDICAL EXAMINATION AT HEALTH CARE FACILITY: Primary | ICD-10-CM

## 2024-09-27 LAB
APPEARANCE UR: ABNORMAL
BACTERIA #/AREA URNS AUTO: ABNORMAL /HPF
BILIRUB UR STRIP.AUTO-MCNC: NEGATIVE MG/DL
CAOX CRY #/AREA UR COMP ASSIST: ABNORMAL /HPF
COLOR UR: YELLOW
GLUCOSE UR STRIP.AUTO-MCNC: NORMAL MG/DL
HYALINE CASTS #/AREA URNS AUTO: ABNORMAL /LPF
KETONES UR STRIP.AUTO-MCNC: NEGATIVE MG/DL
LEUKOCYTE ESTERASE UR QL STRIP.AUTO: ABNORMAL
MUCOUS THREADS #/AREA URNS AUTO: ABNORMAL /LPF
NITRITE UR QL STRIP.AUTO: ABNORMAL
PH UR STRIP.AUTO: 5.5 [PH]
PROT UR STRIP.AUTO-MCNC: NEGATIVE MG/DL
RBC # UR STRIP.AUTO: ABNORMAL /UL
RBC #/AREA URNS AUTO: ABNORMAL /HPF
SP GR UR STRIP.AUTO: 1.02
SQUAMOUS #/AREA URNS AUTO: ABNORMAL /HPF
UROBILINOGEN UR STRIP.AUTO-MCNC: NORMAL MG/DL
WBC #/AREA URNS AUTO: ABNORMAL /HPF
WBC CLUMPS #/AREA URNS AUTO: ABNORMAL /HPF

## 2024-09-27 PROCEDURE — 1036F TOBACCO NON-USER: CPT | Performed by: FAMILY MEDICINE

## 2024-09-27 PROCEDURE — 99215 OFFICE O/P EST HI 40 MIN: CPT | Performed by: FAMILY MEDICINE

## 2024-09-27 PROCEDURE — 99397 PER PM REEVAL EST PAT 65+ YR: CPT | Performed by: FAMILY MEDICINE

## 2024-09-27 PROCEDURE — 1170F FXNL STATUS ASSESSED: CPT | Performed by: FAMILY MEDICINE

## 2024-09-27 PROCEDURE — G0439 PPPS, SUBSEQ VISIT: HCPCS | Performed by: FAMILY MEDICINE

## 2024-09-27 PROCEDURE — 1123F ACP DISCUSS/DSCN MKR DOCD: CPT | Performed by: FAMILY MEDICINE

## 2024-09-27 PROCEDURE — 81001 URINALYSIS AUTO W/SCOPE: CPT

## 2024-09-27 PROCEDURE — 1159F MED LIST DOCD IN RCRD: CPT | Performed by: FAMILY MEDICINE

## 2024-09-27 RX ORDER — ATORVASTATIN CALCIUM 40 MG/1
20 TABLET, FILM COATED ORAL DAILY
Qty: 100 TABLET | Refills: 3 | Status: SHIPPED | OUTPATIENT
Start: 2024-09-27

## 2024-09-27 ASSESSMENT — ENCOUNTER SYMPTOMS
LOSS OF SENSATION IN FEET: 0
OCCASIONAL FEELINGS OF UNSTEADINESS: 0
DEPRESSION: 0

## 2024-09-27 ASSESSMENT — PATIENT HEALTH QUESTIONNAIRE - PHQ9
2. FEELING DOWN, DEPRESSED OR HOPELESS: NOT AT ALL
1. LITTLE INTEREST OR PLEASURE IN DOING THINGS: NOT AT ALL
SUM OF ALL RESPONSES TO PHQ9 QUESTIONS 1 AND 2: 0

## 2024-09-27 ASSESSMENT — ACTIVITIES OF DAILY LIVING (ADL)
GROCERY_SHOPPING: INDEPENDENT
MANAGING_FINANCES: INDEPENDENT
DRESSING: INDEPENDENT
TAKING_MEDICATION: INDEPENDENT
DOING_HOUSEWORK: INDEPENDENT
BATHING: INDEPENDENT

## 2024-09-27 NOTE — PATIENT INSTRUCTIONS
Today doing Medicare wellness, annual preventative, annual lab review.    -----    Due for latest flu shot and latest coronavirus booster.  Up-to-date with the new RSV shot.    Is UTD on shingles series. Most recent Tdap was 2018. Up-to-date on pneumonia series.  -->> Check with your pharmacy to keep up-to-date on immunizations.    Screening for colon cancer. -->>  Next Cologuard due around October 2026.    Screening for breast cancer.  Patient is on 2-year schedule for mammograms. -->>  We will plan to repeat next mammogram around October 2025.     -----      New annual labs reviewed:      -White blood cells in urine, 6-10 WBC per hpf noted on microscopic exam.  Patient denies any symptoms of UTI.  In the past has had positive symptoms when she had a UTI. -->>  We will recheck urinalysis in 3 to 12 months.    - Drug therapy, screening for condition, A1c is 5.4, so no diabetes.    -Hypothyroidism, TSH is 4.23, T3 and free T4 both within normal limits.  Patient denies any symptoms of hyper or hypothyroidism. -->>  Continue 25 mcg daily.  We will recheck in a year.    - vitamin D deficiency. 56, was 65, 31 in 2019.  Goal is .  On daily vitamin D, not sure of the dose, used to be 4000 units, 2 x 2000.  -->>  Make sure you are taking at least 5000 units / 125 mcg daily of vitamin D3.   Will check  with next annual labs.    - Stage IIIa renal insufficiency/chronic kidney disease.  Estimated GFR 54, was 52. Very stable. -->>  We will recheck with next annual labs.    - Hyperlipidemia, cholesterol numbers not sufficiently controlled on atorvastatin 20 mg daily plus co-Q10. -->>  Will increase to 40 mg atorvastatin daily.  Continue co-Q10.  Will check with next annual labs.      - Hematuria, with 11-20 WBC per hpf. Patient denies symptoms of UTI.  We will recheck today.  Call us in a week if you do not hear results.      Urge incontinence.  Symptoms getting worse over time.  Insert aero we discussed could try  prescribing Myrbetriq or oxybutynin if it gets bad enough that he would like to try prescription.  Otherwise try scheduled voiding, meaning go pee every hour on the hour whether you have to or not and that will help keep your bladder from getting so full that he gets stimulated to try to make you suddenly go pee.    Anxiety.  In general does well with 20 mg of citalopram/Celexa daily. -->>  Will refill as needed.      DVT and PE, seeing Dr. Miranda, and Griselda Jones, cardiology, is on Xarelto.  Recent cardiac CT calcium scoring result was 0. -->>  Follow-up as planned.      -Drawing a UA today for hematuria.  - Patient will return in 1 year for for Medicare wellness with annual preventative and annual lab review.    - Patient is going to go to the [facility] about a week before the appointment to get fasting annual labs done.

## 2024-09-27 NOTE — PROGRESS NOTES
"Subjective   Patient ID: Jacquelyn Groves is a 74 y.o. female who presents for MCW / Lab REview (Pt in today for her Medicare Wellness / lab review FU).    Review of Systems  Denies N/V/D/C, no HA/S/V, denies rashes/lesions, no CP/SOB. Denies fevers/chills.  All other systems were negative.     Objective   /87 (BP Location: Right arm, Patient Position: Sitting)   Pulse 56   Ht 1.556 m (5' 1.25\")   Wt 64 kg (141 lb)   SpO2 95%   BMI 26.42 kg/m²     Physical Exam  Gen: NAD  eyes: EOMI, PERRLA  ENT: hearing grossly intact, no nasal discharge  resp: CTABL, without R/R  heart: RRR without MRG  GI: abd: S/ND/NT, BS+  lymph: no axillary, cervical, supraclavicular lymphadenopathy noted   MS: gait grossly WNL,  derm: no rashes or lesions noted  neuro: CN II-XII grossly intact  psych: A&Ox3    Assessment/Plan   Problem List Items Addressed This Visit             ICD-10-CM    Hyperlipidemia E78.5    Relevant Medications    atorvastatin (Lipitor) 40 mg tablet    Other Relevant Orders    Lipid Panel    Hypothyroidism E03.9    Vitamin D deficiency E55.9    Relevant Orders    Vitamin D 25-Hydroxy,Total (for eval of Vitamin D levels)    Screening for condition Z13.9    Relevant Orders    TSH with reflex to Free T4 if abnormal    Lipid Panel    Hemoglobin A1C    Drug therapy Z79.899    Relevant Orders    CBC and Auto Differential    Comprehensive Metabolic Panel    Magnesium    Urinalysis with Reflex Microscopic    Preventative health care Z00.00     Other Visit Diagnoses         Codes    Routine general medical examination at health care facility    -  Primary Z00.00    Hematuria, unspecified type     R31.9    Relevant Orders    Urinalysis with Reflex Microscopic    Encounter for screening mammogram for malignant neoplasm of breast     Z12.31    Encounter for screening for malignant neoplasm of colon     Z12.11                 Today doing Medicare wellness, annual preventative, annual lab review.    -----    Due for latest " flu shot and latest coronavirus booster.  Up-to-date with the new RSV shot.    Is UTD on shingles series. Most recent Tdap was 2018. Up-to-date on pneumonia series.  -->> Check with your pharmacy to keep up-to-date on immunizations.    Screening for colon cancer. -->>  Next Cologuard due around October 2026.    Screening for breast cancer.  Patient is on 2-year schedule for mammograms. -->>  We will plan to repeat next mammogram around October 2025.     -----      New annual labs reviewed:      -White blood cells in urine, 6-10 WBC per hpf noted on microscopic exam.  Patient denies any symptoms of UTI.  In the past has had positive symptoms when she had a UTI. -->>  We will recheck urinalysis in 3 to 12 months.    - Drug therapy, screening for condition, A1c is 5.4, so no diabetes.    -Hypothyroidism, TSH is 4.23, T3 and free T4 both within normal limits.  Patient denies any symptoms of hyper or hypothyroidism. -->>  Continue 25 mcg daily.  We will recheck in a year.    - vitamin D deficiency. 56, was 65, 31 in 2019.  Goal is .  On daily vitamin D, not sure of the dose, used to be 4000 units, 2 x 2000.  -->>  Make sure you are taking at least 5000 units / 125 mcg daily of vitamin D3.   Will check  with next annual labs.    - Stage IIIa renal insufficiency/chronic kidney disease.  Estimated GFR 54, was 52. Very stable. -->>  We will recheck with next annual labs.    - Hyperlipidemia, cholesterol numbers not sufficiently controlled on atorvastatin 20 mg daily plus co-Q10. -->>  Will increase to 40 mg atorvastatin daily.  Continue co-Q10.  Will check with next annual labs.      - Hematuria, with 11-20 WBC per hpf. Patient denies symptoms of UTI.  We will recheck today.  Call us in a week if you do not hear results.      Urge incontinence.  Symptoms getting worse over time.  Insert aero we discussed could try prescribing Myrbetriq or oxybutynin if it gets bad enough that he would like to try prescription.   Otherwise try scheduled voiding, meaning go pee every hour on the hour whether you have to or not and that will help keep your bladder from getting so full that he gets stimulated to try to make you suddenly go pee.    Anxiety.  In general does well with 20 mg of citalopram/Celexa daily. -->>  Will refill as needed.      DVT and PE, seeing Dr. Miranda, and Griselad Jones, cardiology, is on Xarelto.  Recent cardiac CT calcium scoring result was 0. -->>  Follow-up as planned.      - Drawing a UA today for hematuria.  - Patient will return in 1 year for for Medicare wellness with annual preventative and annual lab review.    - Patient is going to go to the [facility] about a week before the appointment to get fasting annual labs plus thyroid panel done.

## 2024-10-21 ENCOUNTER — APPOINTMENT (OUTPATIENT)
Dept: PRIMARY CARE | Facility: CLINIC | Age: 74
End: 2024-10-21
Payer: MEDICARE

## 2024-11-08 ENCOUNTER — OFFICE VISIT (OUTPATIENT)
Dept: CARDIOLOGY | Facility: CLINIC | Age: 74
End: 2024-11-08
Payer: MEDICARE

## 2024-11-08 VITALS
BODY MASS INDEX: 26.68 KG/M2 | DIASTOLIC BLOOD PRESSURE: 66 MMHG | TEMPERATURE: 97.1 F | RESPIRATION RATE: 16 BRPM | HEIGHT: 62 IN | HEART RATE: 68 BPM | SYSTOLIC BLOOD PRESSURE: 122 MMHG | OXYGEN SATURATION: 97 % | WEIGHT: 145 LBS

## 2024-11-08 DIAGNOSIS — E78.2 MIXED HYPERLIPIDEMIA: ICD-10-CM

## 2024-11-08 DIAGNOSIS — Z86.718 HISTORY OF VENOUS THROMBOEMBOLISM: Primary | ICD-10-CM

## 2024-11-08 DIAGNOSIS — Z79.01 ANTICOAGULANT LONG-TERM USE: ICD-10-CM

## 2024-11-08 PROCEDURE — 99213 OFFICE O/P EST LOW 20 MIN: CPT | Performed by: INTERNAL MEDICINE

## 2024-11-08 PROCEDURE — 1123F ACP DISCUSS/DSCN MKR DOCD: CPT | Performed by: INTERNAL MEDICINE

## 2024-11-08 PROCEDURE — 1159F MED LIST DOCD IN RCRD: CPT | Performed by: INTERNAL MEDICINE

## 2024-11-08 PROCEDURE — 1036F TOBACCO NON-USER: CPT | Performed by: INTERNAL MEDICINE

## 2024-11-08 NOTE — PATIENT INSTRUCTIONS
Continue medications as prescribed.    See you back in 1 year for check in.    Griselda Knowles MD  Co-Director, Vascular Center  [facility], [facility]   [facility] Master Clinician in Fibromuscular Dysplasia and Vascular Care  Professor of Medicine  [facility]
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
## 2025-01-07 ENCOUNTER — TELEPHONE (OUTPATIENT)
Dept: PRIMARY CARE | Facility: CLINIC | Age: 75
End: 2025-01-07
Payer: MEDICARE

## 2025-01-07 NOTE — TELEPHONE ENCOUNTER
Pt called asking what dosage of CO-Q 10 she should be taking? She threw the old bottle away so she just wants to double check.

## 2025-05-27 DIAGNOSIS — F41.9 ANXIETY: ICD-10-CM

## 2025-05-27 RX ORDER — CITALOPRAM 20 MG/1
20 TABLET ORAL DAILY
Qty: 90 TABLET | Refills: 1 | Status: SHIPPED | OUTPATIENT
Start: 2025-05-27

## 2025-08-01 DIAGNOSIS — Z79.01 ANTICOAGULANT LONG-TERM USE: ICD-10-CM

## 2025-09-29 ENCOUNTER — APPOINTMENT (OUTPATIENT)
Dept: PRIMARY CARE | Facility: CLINIC | Age: 75
End: 2025-09-29
Payer: MEDICARE